# Patient Record
Sex: FEMALE | Race: BLACK OR AFRICAN AMERICAN | URBAN - METROPOLITAN AREA
[De-identification: names, ages, dates, MRNs, and addresses within clinical notes are randomized per-mention and may not be internally consistent; named-entity substitution may affect disease eponyms.]

---

## 2020-06-11 ENCOUNTER — LAB OUTSIDE AN ENCOUNTER (OUTPATIENT)
Dept: URBAN - METROPOLITAN AREA CLINIC 40 | Facility: CLINIC | Age: 45
End: 2020-06-11

## 2020-06-11 ENCOUNTER — OFFICE VISIT (OUTPATIENT)
Dept: URBAN - METROPOLITAN AREA CLINIC 40 | Facility: CLINIC | Age: 45
End: 2020-06-11
Payer: COMMERCIAL

## 2020-06-11 ENCOUNTER — OFFICE VISIT (OUTPATIENT)
Dept: URBAN - METROPOLITAN AREA CLINIC 74 | Facility: CLINIC | Age: 45
End: 2020-06-11

## 2020-06-11 DIAGNOSIS — K64.1 GRADE II HEMORRHOIDS: ICD-10-CM

## 2020-06-11 DIAGNOSIS — K92.1 BLACK STOOL: ICD-10-CM

## 2020-06-11 PROCEDURE — 99215 OFFICE O/P EST HI 40 MIN: CPT | Performed by: INTERNAL MEDICINE

## 2020-06-11 PROCEDURE — G9903 PT SCRN TBCO ID AS NON USER: HCPCS | Performed by: INTERNAL MEDICINE

## 2020-06-11 PROCEDURE — 46221 LIGATION OF HEMORRHOID(S): CPT | Performed by: INTERNAL MEDICINE

## 2020-06-11 PROCEDURE — 1036F TOBACCO NON-USER: CPT | Performed by: INTERNAL MEDICINE

## 2020-06-11 RX ORDER — TAMOXIFEN CITRATE 20 MG/1
TABLET, FILM COATED ORAL
Qty: 0 | Refills: 0 | Status: ACTIVE | COMMUNITY
Start: 1900-01-01

## 2020-06-11 RX ORDER — MELATONIN 5 MG
TABLET ORAL
Qty: 0 | Refills: 0 | Status: ON HOLD | COMMUNITY
Start: 1900-01-01

## 2020-06-11 NOTE — HPI-TODAY'S VISIT:
Patient returns for follow-up for evaluation of recurrent hemorrhoid symptoms.  She had a negative colonoscopy last year and then hemorrhoid banding x3.  She did well for a few months and her bleeding stopped and her discomfort had resolved.  Now over the past few months she is noticed recurrent anal discomfort with bowel movements and also with prolonged sitting.  Urge to defecate.  Sometimes anal spasm symptoms.  Mild fecal soiling.  The main issue today is recurrent discomfort from her hemorrhoids.

## 2020-06-26 ENCOUNTER — OFFICE VISIT (OUTPATIENT)
Dept: URBAN - METROPOLITAN AREA CLINIC 40 | Facility: CLINIC | Age: 45
End: 2020-06-26

## 2020-07-16 ENCOUNTER — OFFICE VISIT (OUTPATIENT)
Dept: URBAN - METROPOLITAN AREA CLINIC 40 | Facility: CLINIC | Age: 45
End: 2020-07-16
Payer: COMMERCIAL

## 2020-07-16 DIAGNOSIS — K64.1 GRADE II HEMORRHOIDS: ICD-10-CM

## 2020-07-16 PROCEDURE — 46221 LIGATION OF HEMORRHOID(S): CPT | Performed by: INTERNAL MEDICINE

## 2020-07-16 RX ORDER — MELATONIN 5 MG
TABLET ORAL
Qty: 0 | Refills: 0 | Status: ON HOLD | COMMUNITY
Start: 1900-01-01

## 2020-07-16 RX ORDER — TAMOXIFEN CITRATE 20 MG/1
TABLET, FILM COATED ORAL
Qty: 0 | Refills: 0 | Status: ACTIVE | COMMUNITY
Start: 1900-01-01

## 2020-10-07 ENCOUNTER — TELEPHONE ENCOUNTER (OUTPATIENT)
Dept: URBAN - METROPOLITAN AREA CLINIC 40 | Facility: CLINIC | Age: 45
End: 2020-10-07

## 2020-10-09 ENCOUNTER — LAB OUTSIDE AN ENCOUNTER (OUTPATIENT)
Dept: URBAN - METROPOLITAN AREA CLINIC 40 | Facility: CLINIC | Age: 45
End: 2020-10-09

## 2020-10-09 ENCOUNTER — OFFICE VISIT (OUTPATIENT)
Dept: URBAN - METROPOLITAN AREA CLINIC 40 | Facility: CLINIC | Age: 45
End: 2020-10-09
Payer: COMMERCIAL

## 2020-10-09 DIAGNOSIS — K64.1 GRADE II HEMORRHOIDS: ICD-10-CM

## 2020-10-09 DIAGNOSIS — R13.10 ESOPHAGEAL DYSPHAGIA: ICD-10-CM

## 2020-10-09 DIAGNOSIS — R12 WATERBRASH SYMPTOM: ICD-10-CM

## 2020-10-09 DIAGNOSIS — K62.5 RECTAL BLEEDING: ICD-10-CM

## 2020-10-09 DIAGNOSIS — K21.9 GERD WITHOUT ESOPHAGITIS: ICD-10-CM

## 2020-10-09 PROCEDURE — 99214 OFFICE O/P EST MOD 30 MIN: CPT | Performed by: INTERNAL MEDICINE

## 2020-10-09 RX ORDER — TAMOXIFEN CITRATE 20 MG/1
TABLET, FILM COATED ORAL
Qty: 0 | Refills: 0 | Status: ACTIVE | COMMUNITY
Start: 1900-01-01

## 2020-10-09 RX ORDER — MELATONIN 5 MG
TABLET ORAL
Qty: 0 | Refills: 0 | Status: ON HOLD | COMMUNITY
Start: 1900-01-01

## 2020-10-09 RX ORDER — PANTOPRAZOLE SODIUM 40 MG/1
1 TABLET TABLET, DELAYED RELEASE ORAL ONCE A DAY
Qty: 90 TABLET | Refills: 3 | OUTPATIENT
Start: 2020-10-09

## 2020-10-09 NOTE — HPI-TODAY'S VISIT:
Patient returns to clinic for follow-up.  Recall she had a normal colonoscopy last year 2019.  She had hemorrhoid banding x3 last year.  I saw her a couple of months ago for recurrent rectal and anal pain with wipe bleeding.  She had re-banding at that time.  I saw her in follow-up and she had 1 additional band placed, right anterior, a total of 5 hemorrhoid bands total now.  She has ongoing discomfort with bowel movements, wipe bleeding and now also blood mixed with stool.  Topical agents such as Preparation H or suppositories or not helping.  No red flag symptoms noted otherwise. She also has new onset epigastric abdominal pain acid reflux-like symptoms chest discomfort and burning.  No specific alleviating or aggravating factors.  She also has vomiting.  Regurgitation is especially bad if she lays down.  She also has water brash at night.  And occasional solid food dysphasia.  She has taken Tums and Pepcid with modest relief of symptoms, still persistent symptoms are noted.

## 2020-10-27 ENCOUNTER — OFFICE VISIT (OUTPATIENT)
Dept: URBAN - METROPOLITAN AREA SURGERY CENTER 30 | Facility: SURGERY CENTER | Age: 45
End: 2020-10-27
Payer: COMMERCIAL

## 2020-10-27 ENCOUNTER — CLAIMS CREATED FROM THE CLAIM WINDOW (OUTPATIENT)
Dept: URBAN - METROPOLITAN AREA CLINIC 4 | Facility: CLINIC | Age: 45
End: 2020-10-27
Payer: COMMERCIAL

## 2020-10-27 DIAGNOSIS — R13.10 ABNORMAL SWALLOWING: ICD-10-CM

## 2020-10-27 DIAGNOSIS — K31.89 OTHER DISEASES OF STOMACH AND DUODENUM: ICD-10-CM

## 2020-10-27 DIAGNOSIS — K21.0 GASTRO-ESOPHAGEAL REFLUX DISEASE WITH ESOPHAGITIS: ICD-10-CM

## 2020-10-27 DIAGNOSIS — K21.9 ACID REFLUX: ICD-10-CM

## 2020-10-27 PROCEDURE — 43450 DILATE ESOPHAGUS 1/MULT PASS: CPT | Performed by: INTERNAL MEDICINE

## 2020-10-27 PROCEDURE — 88305 TISSUE EXAM BY PATHOLOGIST: CPT | Performed by: PATHOLOGY

## 2020-10-27 PROCEDURE — G8907 PT DOC NO EVENTS ON DISCHARG: HCPCS | Performed by: INTERNAL MEDICINE

## 2020-10-27 PROCEDURE — 88312 SPECIAL STAINS GROUP 1: CPT | Performed by: PATHOLOGY

## 2020-10-27 PROCEDURE — 43239 EGD BIOPSY SINGLE/MULTIPLE: CPT | Performed by: INTERNAL MEDICINE

## 2020-11-12 ENCOUNTER — OFFICE VISIT (OUTPATIENT)
Dept: URBAN - METROPOLITAN AREA CLINIC 40 | Facility: CLINIC | Age: 45
End: 2020-11-12

## 2020-11-13 ENCOUNTER — OFFICE VISIT (OUTPATIENT)
Dept: URBAN - METROPOLITAN AREA TELEHEALTH 2 | Facility: TELEHEALTH | Age: 45
End: 2020-11-13
Payer: COMMERCIAL

## 2020-11-13 DIAGNOSIS — K62.5 RECTAL BLEEDING: ICD-10-CM

## 2020-11-13 DIAGNOSIS — R12 WATERBRASH SYMPTOM: ICD-10-CM

## 2020-11-13 DIAGNOSIS — K64.1 GRADE II HEMORRHOIDS: ICD-10-CM

## 2020-11-13 DIAGNOSIS — K21.9 GERD WITHOUT ESOPHAGITIS: ICD-10-CM

## 2020-11-13 DIAGNOSIS — R13.19 OTHER DYSPHAGIA: ICD-10-CM

## 2020-11-13 PROCEDURE — G8417 CALC BMI ABV UP PARAM F/U: HCPCS | Performed by: INTERNAL MEDICINE

## 2020-11-13 PROCEDURE — G8427 DOCREV CUR MEDS BY ELIG CLIN: HCPCS | Performed by: INTERNAL MEDICINE

## 2020-11-13 PROCEDURE — 99213 OFFICE O/P EST LOW 20 MIN: CPT | Performed by: INTERNAL MEDICINE

## 2020-11-13 RX ORDER — PANTOPRAZOLE SODIUM 40 MG/1
1 TABLET TABLET, DELAYED RELEASE ORAL ONCE A DAY
Qty: 90 TABLET | Refills: 3 | Status: ACTIVE | COMMUNITY
Start: 2020-10-09

## 2020-11-13 RX ORDER — TAMOXIFEN CITRATE 20 MG/1
TABLET, FILM COATED ORAL
Qty: 0 | Refills: 0 | Status: ACTIVE | COMMUNITY
Start: 1900-01-01

## 2020-11-13 RX ORDER — PANTOPRAZOLE SODIUM 40 MG/1
1 TABLET TABLET, DELAYED RELEASE ORAL ONCE A DAY
Qty: 90 | Refills: 1 | OUTPATIENT

## 2020-11-13 RX ORDER — MELATONIN 5 MG
TABLET ORAL
Qty: 0 | Refills: 0 | Status: ON HOLD | COMMUNITY
Start: 1900-01-01

## 2020-11-13 NOTE — HPI-TODAY'S VISIT:
Ms. Aly is a 45 year old black female who presents for telehealth visit after recent EGD for evaluation of GERD. History of rectal bleeding.  Recall she had a normal colonoscopy last year 2019.  She had hemorrhoid banding x3 last year.  Has reported recurrent rectal and anal pain with wipe blIeeding.  She had re-banding at that time. Seen in follow-up and she had 1 additional band placed, right anterior, a total of 5 hemorrhoid bands total now.  She has ongoing discomfort with bowel movements, wipe bleeding and now also blood mixed with stool.  Topical agents such as Preparation H or suppositories or not helping.  No red flag symptoms noted otherwise. She also has new onset epigastric abdominal pain acid reflux-like symptoms chest discomfort and burning.  No specific alleviating or aggravating factors.  She also has vomiting.  Regurgitation is especially bad if she lays down.  She also has water brash at night.  And occasional solid food dysphasia.  She has taken Tums and Pepcid with modest relief of symptoms, still persistent symptoms are noted. She did have an EGD with Dr. Bautista on October 27, 2020.  The esophagus, duodenum, and stomach were all normal.  She was empirically dilated to 54 French due to complaint of dysphagia.  Biopsies obtained from the stomach and esophagus with no significant abnormality of the stomach.  Reflux changes from esophageal biopsies, however, no evidence of Lindsay's metaplasia or eosinophilic esophagitis.  No infectious organisms. She reports feeling so much better on pantoprazole 40 mg daily.  Denies any trouble swallowing.  Good appetite and weight stable.  She has modified her diet to avoid caffeine which seems to be a trigger.  Admits that she does like to eat some sweets.  Drinking more water.  No new complaints.  Admits to headache which she originally thought may have been secondary to melatonin.  However, with decreasing melatonin, there was no significant change in her headaches.  She is now taking diclofenac once daily as needed.  Denies any GI bleeding.

## 2020-12-11 ENCOUNTER — OFFICE VISIT (OUTPATIENT)
Dept: URBAN - METROPOLITAN AREA CLINIC 40 | Facility: CLINIC | Age: 45
End: 2020-12-11

## 2021-05-18 ENCOUNTER — APPOINTMENT (RX ONLY)
Dept: URBAN - METROPOLITAN AREA OTHER 10 | Facility: OTHER | Age: 46
Setting detail: DERMATOLOGY
End: 2021-05-18

## 2021-05-18 ENCOUNTER — RX ONLY (OUTPATIENT)
Age: 46
Setting detail: RX ONLY
End: 2021-05-18

## 2021-05-18 DIAGNOSIS — L21.8 OTHER SEBORRHEIC DERMATITIS: ICD-10-CM

## 2021-05-18 DIAGNOSIS — L70.0 ACNE VULGARIS: ICD-10-CM

## 2021-05-18 DIAGNOSIS — L72.8 OTHER FOLLICULAR CYSTS OF THE SKIN AND SUBCUTANEOUS TISSUE: ICD-10-CM

## 2021-05-18 PROBLEM — D23.61 OTHER BENIGN NEOPLASM OF SKIN OF RIGHT UPPER LIMB, INCLUDING SHOULDER: Status: ACTIVE | Noted: 2021-05-18

## 2021-05-18 PROCEDURE — ? OTHER

## 2021-05-18 PROCEDURE — ? COUNSELING

## 2021-05-18 PROCEDURE — ? CONSULTATION EXCISION

## 2021-05-18 PROCEDURE — 99204 OFFICE O/P NEW MOD 45 MIN: CPT

## 2021-05-18 PROCEDURE — ? PRESCRIPTION

## 2021-05-18 RX ORDER — CLOBETASOL PROPIONATE 0.5 MG/ML
SOLUTION TOPICAL QHS
Qty: 50 | Refills: 3 | Status: ERX

## 2021-05-18 RX ORDER — CLOBETASOL PROPIONATE 0.5 MG/ML
SOLUTION TOPICAL QHS
Qty: 50 | Refills: 3 | Status: ERX | COMMUNITY
Start: 2021-05-18

## 2021-05-18 RX ORDER — TRETIONIN 0.5 MG/G
CREAM TOPICAL QHS
Qty: 45 | Refills: 3 | Status: ERX | COMMUNITY
Start: 2021-05-18

## 2021-05-18 RX ADMIN — TRETIONIN: 0.5 CREAM TOPICAL at 00:00

## 2021-05-18 RX ADMIN — CLOBETASOL PROPIONATE: 0.5 SOLUTION TOPICAL at 00:00

## 2021-05-18 ASSESSMENT — LOCATION ZONE DERM
LOCATION ZONE: FACE
LOCATION ZONE: EAR
LOCATION ZONE: SCALP

## 2021-05-18 ASSESSMENT — LOCATION SIMPLE DESCRIPTION DERM
LOCATION SIMPLE: LEFT CHEEK
LOCATION SIMPLE: LEFT EAR
LOCATION SIMPLE: RIGHT CHEEK
LOCATION SIMPLE: POSTERIOR SCALP

## 2021-05-18 ASSESSMENT — LOCATION DETAILED DESCRIPTION DERM
LOCATION DETAILED: RIGHT MID PREAURICULAR CHEEK
LOCATION DETAILED: LEFT INFERIOR CENTRAL MALAR CHEEK
LOCATION DETAILED: RIGHT INFERIOR CENTRAL MALAR CHEEK
LOCATION DETAILED: LEFT POSTERIOR EAR
LOCATION DETAILED: POSTERIOR MID-PARIETAL SCALP

## 2021-05-18 ASSESSMENT — SEVERITY ASSESSMENT: HOW SEVERE IS THIS PATIENT'S CONDITION?: MILD

## 2021-05-18 NOTE — PROCEDURE: OTHER
Note Text (......Xxx Chief Complaint.): This diagnosis correlates with the
Detail Level: Zone
Other (Free Text): Small cyst also posterior left ear
Render Risk Assessment In Note?: no

## 2021-05-18 NOTE — PROCEDURE: COUNSELING
Detail Level: Detailed
Birth Control Pills Counseling: Birth Control Pill Counseling: I discussed with the patient the potential side effects of OCPs including but not limited to increased risk of stroke, heart attack, thrombophlebitis, deep venous thrombosis, hepatic adenomas, breast changes, GI upset, headaches, and depression.  The patient verbalized understanding of the proper use and possible adverse effects of OCPs. All of the patient's questions and concerns were addressed.
Topical Retinoid counseling:  Patient advised to apply a pea-sized amount only at bedtime and wait 30 minutes after washing their face before applying.  If too drying, patient may add a non-comedogenic moisturizer. The patient verbalized understanding of the proper use and possible adverse effects of retinoids.  All of the patient's questions and concerns were addressed.
Spironolactone Pregnancy And Lactation Text: This medication can cause feminization of the male fetus and should be avoided during pregnancy. The active metabolite is also found in breast milk.
Isotretinoin Pregnancy And Lactation Text: This medication is Pregnancy Category X and is considered extremely dangerous during pregnancy. It is unknown if it is excreted in breast milk.
Topical Clindamycin Counseling: Patient counseled that this medication may cause skin irritation or allergic reactions.  In the event of skin irritation, the patient was advised to reduce the amount of the drug applied or use it less frequently.   The patient verbalized understanding of the proper use and possible adverse effects of clindamycin.  All of the patient's questions and concerns were addressed.
Topical Clindamycin Pregnancy And Lactation Text: This medication is Pregnancy Category B and is considered safe during pregnancy. It is unknown if it is excreted in breast milk.
Doxycycline Counseling:  Patient counseled regarding possible photosensitivity and increased risk for sunburn.  Patient instructed to avoid sunlight, if possible.  When exposed to sunlight, patients should wear protective clothing, sunglasses, and sunscreen.  The patient was instructed to call the office immediately if the following severe adverse effects occur:  hearing changes, easy bruising/bleeding, severe headache, or vision changes.  The patient verbalized understanding of the proper use and possible adverse effects of doxycycline.  All of the patient's questions and concerns were addressed.
Minocycline Pregnancy And Lactation Text: This medication is Pregnancy Category D and not consider safe during pregnancy. It is also excreted in breast milk.
High Dose Vitamin A Counseling: Side effects reviewed, pt to contact office should one occur.
Birth Control Pills Pregnancy And Lactation Text: This medication should be avoided if pregnant and for the first 30 days post-partum.
Tetracycline Counseling: Patient counseled regarding possible photosensitivity and increased risk for sunburn.  Patient instructed to avoid sunlight, if possible.  When exposed to sunlight, patients should wear protective clothing, sunglasses, and sunscreen.  The patient was instructed to call the office immediately if the following severe adverse effects occur:  hearing changes, easy bruising/bleeding, severe headache, or vision changes.  The patient verbalized understanding of the proper use and possible adverse effects of tetracycline.  All of the patient's questions and concerns were addressed. Patient understands to avoid pregnancy while on therapy due to potential birth defects.
Use Enhanced Medication Counseling?: No
Doxycycline Pregnancy And Lactation Text: This medication is Pregnancy Category D and not consider safe during pregnancy. It is also excreted in breast milk but is considered safe for shorter treatment courses.
Azithromycin Counseling:  I discussed with the patient the risks of azithromycin including but not limited to GI upset, allergic reaction, drug rash, diarrhea, and yeast infections.
Sarecycline Counseling: Patient advised regarding possible photosensitivity and discoloration of the teeth, skin, lips, tongue and gums.  Patient instructed to avoid sunlight, if possible.  When exposed to sunlight, patients should wear protective clothing, sunglasses, and sunscreen.  The patient was instructed to call the office immediately if the following severe adverse effects occur:  hearing changes, easy bruising/bleeding, severe headache, or vision changes.  The patient verbalized understanding of the proper use and possible adverse effects of sarecycline.  All of the patient's questions and concerns were addressed.
Topical Retinoid Pregnancy And Lactation Text: This medication is Pregnancy Category C. It is unknown if this medication is excreted in breast milk.
Tazorac Counseling:  Patient advised that medication is irritating and drying.  Patient may need to apply sparingly and wash off after an hour before eventually leaving it on overnight.  The patient verbalized understanding of the proper use and possible adverse effects of tazorac.  All of the patient's questions and concerns were addressed.
Dapsone Counseling: I discussed with the patient the risks of dapsone including but not limited to hemolytic anemia, agranulocytosis, rashes, methemoglobinemia, kidney failure, peripheral neuropathy, headaches, GI upset, and liver toxicity.  Patients who start dapsone require monitoring including baseline LFTs and weekly CBCs for the first month, then every month thereafter.  The patient verbalized understanding of the proper use and possible adverse effects of dapsone.  All of the patient's questions and concerns were addressed.
High Dose Vitamin A Pregnancy And Lactation Text: High dose vitamin A therapy is contraindicated during pregnancy and breast feeding.
Topical Sulfur Applications Counseling: Topical Sulfur Counseling: Patient counseled that this medication may cause skin irritation or allergic reactions.  In the event of skin irritation, the patient was advised to reduce the amount of the drug applied or use it less frequently.   The patient verbalized understanding of the proper use and possible adverse effects of topical sulfur application.  All of the patient's questions and concerns were addressed.
Bactrim Counseling:  I discussed with the patient the risks of sulfa antibiotics including but not limited to GI upset, allergic reaction, drug rash, diarrhea, dizziness, photosensitivity, and yeast infections.  Rarely, more serious reactions can occur including but not limited to aplastic anemia, agranulocytosis, methemoglobinemia, blood dyscrasias, liver or kidney failure, lung infiltrates or desquamative/blistering drug rashes.
Azithromycin Pregnancy And Lactation Text: This medication is considered safe during pregnancy and is also secreted in breast milk.
Topical Sulfur Applications Pregnancy And Lactation Text: This medication is Pregnancy Category C and has an unknown safety profile during pregnancy. It is unknown if this topical medication is excreted in breast milk.
Spironolactone Counseling: Patient advised regarding risks of diarrhea, abdominal pain, hyperkalemia, birth defects (for female patients), liver toxicity and renal toxicity. The patient may need blood work to monitor liver and kidney function and potassium levels while on therapy. The patient verbalized understanding of the proper use and possible adverse effects of spironolactone.  All of the patient's questions and concerns were addressed.
Benzoyl Peroxide Counseling: Patient counseled that medicine may cause skin irritation and bleach clothing.  In the event of skin irritation, the patient was advised to reduce the amount of the drug applied or use it less frequently.   The patient verbalized understanding of the proper use and possible adverse effects of benzoyl peroxide.  All of the patient's questions and concerns were addressed.
Erythromycin Counseling:  I discussed with the patient the risks of erythromycin including but not limited to GI upset, allergic reaction, drug rash, diarrhea, increase in liver enzymes, and yeast infections.
Bactrim Pregnancy And Lactation Text: This medication is Pregnancy Category D and is known to cause fetal risk.  It is also excreted in breast milk.
Minocycline Counseling: Patient advised regarding possible photosensitivity and discoloration of the teeth, skin, lips, tongue and gums.  Patient instructed to avoid sunlight, if possible.  When exposed to sunlight, patients should wear protective clothing, sunglasses, and sunscreen.  The patient was instructed to call the office immediately if the following severe adverse effects occur:  hearing changes, easy bruising/bleeding, severe headache, or vision changes.  The patient verbalized understanding of the proper use and possible adverse effects of minocycline.  All of the patient's questions and concerns were addressed.
Tazorac Pregnancy And Lactation Text: This medication is not safe during pregnancy. It is unknown if this medication is excreted in breast milk.
Isotretinoin Counseling: Patient should get monthly blood tests, not donate blood, not drive at night if vision affected, not share medication, and not undergo elective surgery for 6 months after tx completed. Side effects reviewed, pt to contact office should one occur.
Erythromycin Pregnancy And Lactation Text: This medication is Pregnancy Category B and is considered safe during pregnancy. It is also excreted in breast milk.
Benzoyl Peroxide Pregnancy And Lactation Text: This medication is Pregnancy Category C. It is unknown if benzoyl peroxide is excreted in breast milk.
Dapsone Pregnancy And Lactation Text: This medication is Pregnancy Category C and is not considered safe during pregnancy or breast feeding.

## 2021-07-01 ENCOUNTER — TELEPHONE ENCOUNTER (OUTPATIENT)
Dept: URBAN - METROPOLITAN AREA CLINIC 40 | Facility: CLINIC | Age: 46
End: 2021-07-01

## 2021-07-02 ENCOUNTER — OFFICE VISIT (OUTPATIENT)
Dept: URBAN - METROPOLITAN AREA CLINIC 40 | Facility: CLINIC | Age: 46
End: 2021-07-02

## 2021-07-07 ENCOUNTER — OFFICE VISIT (OUTPATIENT)
Dept: URBAN - METROPOLITAN AREA CLINIC 40 | Facility: CLINIC | Age: 46
End: 2021-07-07
Payer: COMMERCIAL

## 2021-07-07 ENCOUNTER — WEB ENCOUNTER (OUTPATIENT)
Dept: URBAN - METROPOLITAN AREA CLINIC 40 | Facility: CLINIC | Age: 46
End: 2021-07-07

## 2021-07-07 VITALS
DIASTOLIC BLOOD PRESSURE: 88 MMHG | BODY MASS INDEX: 34.66 KG/M2 | SYSTOLIC BLOOD PRESSURE: 130 MMHG | WEIGHT: 208 LBS | HEIGHT: 65 IN | TEMPERATURE: 97.7 F | HEART RATE: 90 BPM

## 2021-07-07 DIAGNOSIS — R12 WATERBRASH SYMPTOM: ICD-10-CM

## 2021-07-07 DIAGNOSIS — R13.10 ESOPHAGEAL DYSPHAGIA: ICD-10-CM

## 2021-07-07 DIAGNOSIS — E66.9 OBESITY (BMI 30-39.9): ICD-10-CM

## 2021-07-07 DIAGNOSIS — K64.1 GRADE II HEMORRHOIDS: ICD-10-CM

## 2021-07-07 DIAGNOSIS — R10.9 ABDOMINAL PAIN: ICD-10-CM

## 2021-07-07 DIAGNOSIS — K62.5 RECTAL BLEEDING: ICD-10-CM

## 2021-07-07 DIAGNOSIS — K21.9 GERD WITHOUT ESOPHAGITIS: ICD-10-CM

## 2021-07-07 PROCEDURE — 99213 OFFICE O/P EST LOW 20 MIN: CPT | Performed by: INTERNAL MEDICINE

## 2021-07-07 RX ORDER — PANTOPRAZOLE SODIUM 40 MG/1
1 TABLET TABLET, DELAYED RELEASE ORAL ONCE A DAY
Qty: 90 | Refills: 1 | Status: ACTIVE | COMMUNITY

## 2021-07-07 RX ORDER — MELATONIN 5 MG
TABLET ORAL
Qty: 0 | Refills: 0 | COMMUNITY
Start: 1900-01-01

## 2021-07-07 RX ORDER — TAMOXIFEN CITRATE 20 MG/1
TABLET, FILM COATED ORAL
Qty: 0 | Refills: 0 | Status: ACTIVE | COMMUNITY
Start: 1900-01-01

## 2021-07-07 RX ORDER — PANTOPRAZOLE SODIUM 40 MG/1
1 TABLET TABLET, DELAYED RELEASE ORAL ONCE A DAY
Qty: 90 | Refills: 1

## 2021-07-07 NOTE — PHYSICAL EXAM GASTROINTESTINAL
Abdomen , soft, nontender, nondistended , no guarding or rigidity , no masses palpable , normal bowel sounds , Liver and Spleen , no hepatosplenomegaly , spleen not palpable

## 2021-07-07 NOTE — HPI-TODAY'S VISIT:
Ms. Aly is a 46 year old black female who presents last seen for f/u 11/13/20 following EGD for evaluation of GERD. History of rectal bleeding.  Recall, she had a normal colonoscopy in 2019.  She had hemorrhoid banding x3 in 2019.  Has reported recurrent rectal and anal pain with wipe bleeding.  She had re-banding at that time. Seen in follow-up and she had 1 additional band placed, right anterior, a total of 5 hemorrhoid bands total now.  She has ongoing discomfort with bowel movements, wipe bleeding and now also blood mixed with stool.  Topical agents such as Preparation H or suppositories or not helping.  No red flag symptoms noted otherwise. She also has new onset epigastric abdominal pain acid reflux-like symptoms chest discomfort and burning.  No specific alleviating or aggravating factors.  She also has vomiting.  Regurgitation is especially bad if she lays down.  She also has water brash at night. She did have an EGD with Dr. Bautista on October 27, 2020.  The esophagus, duodenum, and stomach were all normal.  She was empirically dilated to 54 French due to complaint of dysphagia.  Biopsies obtained from the stomach and esophagus with no significant abnormality of the stomach.  Reflux changes from esophageal biopsies, however, no evidence of Lindsay's metaplasia or eosinophilic esophagitis.  No infectious organisms. She reported feeling so much better on pantoprazole 40 mg daily.  Denies any trouble swallowing.  Good appetite and weight stable.  She modified her diet to avoid caffeine which seems to be a trigger.  Admits that she does like to eat some sweets.  Drinking more water. No regular NSAID use.  Denies any GI bleeding. She returns to the office today with complaint of recurrent heartburn reflux now only taking pantoprazole as needed. Admits that she has been drinking more coffee with creamer. Also continues to avoid NSAIDs. States that she cannot state that stress is her trigger for midepigastric abdominal pain. Cough. Denies vomiting or dysphagia.

## 2021-07-07 NOTE — PHYSICAL EXAM HENT:
Head , normocephalic , atraumatic , Face , Face within normal limits , Ears , External ears within normal limits

## 2021-08-05 ENCOUNTER — WEB ENCOUNTER (OUTPATIENT)
Dept: URBAN - METROPOLITAN AREA CLINIC 40 | Facility: CLINIC | Age: 46
End: 2021-08-05

## 2021-08-05 ENCOUNTER — OFFICE VISIT (OUTPATIENT)
Dept: URBAN - METROPOLITAN AREA CLINIC 40 | Facility: CLINIC | Age: 46
End: 2021-08-05
Payer: COMMERCIAL

## 2021-08-05 ENCOUNTER — OFFICE VISIT (OUTPATIENT)
Dept: URBAN - METROPOLITAN AREA CLINIC 40 | Facility: CLINIC | Age: 46
End: 2021-08-05

## 2021-08-05 VITALS
WEIGHT: 211 LBS | TEMPERATURE: 97.5 F | HEART RATE: 80 BPM | HEIGHT: 65 IN | SYSTOLIC BLOOD PRESSURE: 128 MMHG | DIASTOLIC BLOOD PRESSURE: 90 MMHG | BODY MASS INDEX: 35.16 KG/M2

## 2021-08-05 DIAGNOSIS — R13.10 ESOPHAGEAL DYSPHAGIA: ICD-10-CM

## 2021-08-05 DIAGNOSIS — R12 WATERBRASH SYMPTOM: ICD-10-CM

## 2021-08-05 DIAGNOSIS — K62.5 RECTAL BLEEDING: ICD-10-CM

## 2021-08-05 DIAGNOSIS — K21.9 GERD WITHOUT ESOPHAGITIS: ICD-10-CM

## 2021-08-05 DIAGNOSIS — K64.1 GRADE II HEMORRHOIDS: ICD-10-CM

## 2021-08-05 DIAGNOSIS — R10.9 ABDOMINAL PAIN: ICD-10-CM

## 2021-08-05 DIAGNOSIS — E66.9 OBESITY (BMI 30-39.9): ICD-10-CM

## 2021-08-05 PROCEDURE — 99213 OFFICE O/P EST LOW 20 MIN: CPT | Performed by: INTERNAL MEDICINE

## 2021-08-05 RX ORDER — PANTOPRAZOLE SODIUM 40 MG/1
1 TABLET TABLET, DELAYED RELEASE ORAL ONCE A DAY
Qty: 90 | Refills: 1 | Status: ACTIVE | COMMUNITY

## 2021-08-05 RX ORDER — MELATONIN 5 MG
TABLET ORAL
Qty: 0 | Refills: 0 | Status: DISCONTINUED | COMMUNITY
Start: 1900-01-01

## 2021-08-05 RX ORDER — TAMOXIFEN CITRATE 20 MG/1
TABLET, FILM COATED ORAL
Qty: 0 | Refills: 0 | Status: ON HOLD | COMMUNITY
Start: 1900-01-01

## 2021-08-05 NOTE — HPI-TODAY'S VISIT:
Ms. Aly is a 46 year old black female who presents last seen for f/u 7/7/21 for evaluation of GERD. History of rectal bleeding.  Recall, she had a normal colonoscopy in 2019.  She had hemorrhoid banding x3 in 2019.  Has reported recurrent rectal and anal pain with wipe bleeding.  She had re-banding at that time. Seen in follow-up and she had 1 additional band placed, right anterior, a total of 5 hemorrhoid bands total now.  Recent onset of epigastric abdominal pain acid reflux-like symptoms chest discomfort and burning.  No specific alleviating or aggravating factors.  She also has vomiting.  Regurgitation is especially bad if she lays down.  She also has water brash at night. She did have an EGD with Dr. Bautista on October 27, 2020.  The esophagus, duodenum, and stomach were all normal.  She was empirically dilated to 54 French due to complaint of dysphagia.  Biopsies obtained from the stomach and esophagus with no significant abnormality of the stomach.  Reflux changes from esophageal biopsies, however, no evidence of Lindsay's metaplasia or eosinophilic esophagitis.  No infectious organisms. She reported feeling so much better on pantoprazole 40 mg daily.  Denies any trouble swallowing.  Good appetite and weight stable.  She modified her diet to avoid caffeine which seems to be a trigger.  Admits that she does like to eat some sweets.  Drinking more water. No regular NSAID use.  Denies any GI bleeding. She returns to the office today with complaint of recurrent heartburn reflux now only taking pantoprazole as needed. She continues to avoid NSAIDs. States that she cannot state that stress is her trigger for midepigastric abdominal pain. Cough. Denies vomiting or dysphagia. Essentially normal RUQ US 7/15/21. Distended gallbladder without stones or cholecystitis. CBD normal. Liver 16mm , otherwise normal. Weight increased since last visit. She is planning to once again improve diet at home.

## 2021-08-25 ENCOUNTER — OFFICE VISIT (OUTPATIENT)
Dept: URBAN - METROPOLITAN AREA CLINIC 40 | Facility: CLINIC | Age: 46
End: 2021-08-25

## 2021-09-09 ENCOUNTER — OFFICE VISIT (OUTPATIENT)
Dept: URBAN - METROPOLITAN AREA CLINIC 40 | Facility: CLINIC | Age: 46
End: 2021-09-09
Payer: COMMERCIAL

## 2021-09-09 VITALS
HEIGHT: 65 IN | DIASTOLIC BLOOD PRESSURE: 90 MMHG | SYSTOLIC BLOOD PRESSURE: 130 MMHG | TEMPERATURE: 97.9 F | WEIGHT: 210 LBS | BODY MASS INDEX: 34.99 KG/M2

## 2021-09-09 DIAGNOSIS — E66.9 OBESITY (BMI 30-39.9): ICD-10-CM

## 2021-09-09 DIAGNOSIS — R10.9 ABDOMINAL PAIN: ICD-10-CM

## 2021-09-09 DIAGNOSIS — K21.9 GERD WITHOUT ESOPHAGITIS: ICD-10-CM

## 2021-09-09 DIAGNOSIS — R13.10 ESOPHAGEAL DYSPHAGIA: ICD-10-CM

## 2021-09-09 DIAGNOSIS — K64.1 GRADE II HEMORRHOIDS: ICD-10-CM

## 2021-09-09 DIAGNOSIS — R12 WATERBRASH SYMPTOM: ICD-10-CM

## 2021-09-09 DIAGNOSIS — K62.5 RECTAL BLEEDING: ICD-10-CM

## 2021-09-09 PROCEDURE — 99213 OFFICE O/P EST LOW 20 MIN: CPT | Performed by: INTERNAL MEDICINE

## 2021-09-09 RX ORDER — PANTOPRAZOLE SODIUM 40 MG/1
1 TABLET TABLET, DELAYED RELEASE ORAL ONCE A DAY
Qty: 30 | Refills: 2

## 2021-09-09 RX ORDER — PANTOPRAZOLE SODIUM 40 MG/1
1 TABLET TABLET, DELAYED RELEASE ORAL ONCE A DAY
Qty: 90 | Refills: 1 | Status: ACTIVE | COMMUNITY

## 2021-09-09 RX ORDER — DICYCLOMINE HYDROCHLORIDE 20 MG/1
1 TABLET TABLET ORAL BID PRN
Qty: 60 | Refills: 2 | OUTPATIENT
Start: 2021-09-09 | End: 2021-12-07

## 2021-09-09 RX ORDER — TAMOXIFEN CITRATE 20 MG/1
TABLET, FILM COATED ORAL
Qty: 0 | Refills: 0 | COMMUNITY
Start: 1900-01-01

## 2021-09-09 NOTE — HPI-TODAY'S VISIT:
Ms. Aly is a 46 year old black female who presents last seen for f/u 8/5/21 for evaluation of GERD. History of rectal bleeding.  Recall, she had a normal colonoscopy in 2019.  She had hemorrhoid banding x3 in 2019.  Has reported recurrent rectal and anal pain with wipe bleeding.  She had re-banding at that time. Seen in follow-up and she had 1 additional band placed, right anterior, a total of 5 hemorrhoid bands total now.  Recent onset of epigastric abdominal pain acid reflux-like symptoms chest discomfort and burning.  No specific alleviating or aggravating factors.  She also has vomiting.  Regurgitation is especially bad if she lays down.   She did have an EGD with Dr. Bautista on October 27, 2020.  The esophagus, duodenum, and stomach were all normal.  She was empirically dilated to 54 French due to complaint of dysphagia.  Biopsies obtained from the stomach and esophagus with no significant abnormality of the stomach.  Reflux changes from esophageal biopsies, however, no evidence of Lindsay's metaplasia or eosinophilic esophagitis.  No infectious organisms. She reported feeling so much better on pantoprazole 40 mg daily.  Denies any trouble swallowing.  Good appetite and weight stable.  She modified her diet to avoid caffeine which seems to be a trigger.  Admits that she does like to eat some sweets.  Drinking more water. No regular NSAID use.  Denies any GI bleeding. She returns to the office today with complaint of recurrent heartburn reflux now only taking pantoprazole as needed. She continues to avoid NSAIDs. States that she cannot state that stress is her trigger for midepigastric abdominal pain. Cough. Denies vomiting or dysphagia. Essentially normal RUQ US 7/15/21. Distended gallbladder without stones or cholecystitis. CBD normal. Liver 16mm , otherwise normal. Weight increased since last visit. She is planning to once again improve diet at home.

## 2021-09-10 ENCOUNTER — TELEPHONE ENCOUNTER (OUTPATIENT)
Dept: URBAN - METROPOLITAN AREA CLINIC 40 | Facility: CLINIC | Age: 46
End: 2021-09-10

## 2021-10-20 ENCOUNTER — OFFICE VISIT (OUTPATIENT)
Dept: URBAN - METROPOLITAN AREA CLINIC 40 | Facility: CLINIC | Age: 46
End: 2021-10-20

## 2021-10-20 RX ORDER — TAMOXIFEN CITRATE 20 MG/1
TABLET, FILM COATED ORAL
Qty: 0 | Refills: 0 | COMMUNITY
Start: 1900-01-01

## 2021-10-20 RX ORDER — DICYCLOMINE HYDROCHLORIDE 20 MG/1
1 TABLET TABLET ORAL BID PRN
Qty: 60 | Refills: 2 | Status: ACTIVE | COMMUNITY
Start: 2021-09-09 | End: 2021-12-07

## 2021-10-20 RX ORDER — PANTOPRAZOLE SODIUM 40 MG/1
1 TABLET TABLET, DELAYED RELEASE ORAL ONCE A DAY
Qty: 30 | Refills: 2 | Status: ACTIVE | COMMUNITY

## 2021-11-19 ENCOUNTER — OFFICE VISIT (OUTPATIENT)
Dept: URBAN - METROPOLITAN AREA CLINIC 40 | Facility: CLINIC | Age: 46
End: 2021-11-19

## 2021-11-19 RX ORDER — PANTOPRAZOLE SODIUM 40 MG/1
1 TABLET TABLET, DELAYED RELEASE ORAL ONCE A DAY
Qty: 30 | Refills: 2 | Status: ACTIVE | COMMUNITY

## 2021-11-19 RX ORDER — TAMOXIFEN CITRATE 20 MG/1
TABLET, FILM COATED ORAL
Qty: 0 | Refills: 0 | Status: ACTIVE | COMMUNITY
Start: 1900-01-01

## 2021-11-19 RX ORDER — DICYCLOMINE HYDROCHLORIDE 20 MG/1
1 TABLET TABLET ORAL BID PRN
Qty: 60 | Refills: 2 | Status: ACTIVE | COMMUNITY
Start: 2021-09-09 | End: 2021-12-07

## 2022-12-07 ENCOUNTER — TELEPHONE ENCOUNTER (OUTPATIENT)
Dept: URBAN - METROPOLITAN AREA CLINIC 40 | Facility: CLINIC | Age: 47
End: 2022-12-07

## 2023-02-01 ENCOUNTER — OFFICE VISIT (OUTPATIENT)
Dept: URBAN - METROPOLITAN AREA CLINIC 74 | Facility: CLINIC | Age: 48
End: 2023-02-01
Payer: COMMERCIAL

## 2023-02-01 ENCOUNTER — LAB OUTSIDE AN ENCOUNTER (OUTPATIENT)
Dept: URBAN - METROPOLITAN AREA CLINIC 74 | Facility: CLINIC | Age: 48
End: 2023-02-01

## 2023-02-01 VITALS
DIASTOLIC BLOOD PRESSURE: 78 MMHG | SYSTOLIC BLOOD PRESSURE: 132 MMHG | TEMPERATURE: 98.1 F | HEART RATE: 96 BPM | WEIGHT: 202 LBS | HEIGHT: 65 IN | OXYGEN SATURATION: 97 % | BODY MASS INDEX: 33.66 KG/M2

## 2023-02-01 DIAGNOSIS — K21.9 GERD WITHOUT ESOPHAGITIS: ICD-10-CM

## 2023-02-01 DIAGNOSIS — K62.5 RECTAL BLEEDING: ICD-10-CM

## 2023-02-01 DIAGNOSIS — R10.9 ABDOMINAL PAIN: ICD-10-CM

## 2023-02-01 DIAGNOSIS — E66.9 OBESITY (BMI 30-39.9): ICD-10-CM

## 2023-02-01 PROBLEM — 414916001 OBESITY: Status: ACTIVE | Noted: 2021-07-07

## 2023-02-01 PROBLEM — 266435005: Status: ACTIVE | Noted: 2020-10-09

## 2023-02-01 PROBLEM — 40890009: Status: ACTIVE | Noted: 2020-10-09

## 2023-02-01 PROCEDURE — 99214 OFFICE O/P EST MOD 30 MIN: CPT | Performed by: INTERNAL MEDICINE

## 2023-02-01 RX ORDER — PANTOPRAZOLE SODIUM 40 MG/1
1 TABLET TABLET, DELAYED RELEASE ORAL ONCE A DAY
Qty: 30 | Refills: 2 | Status: ON HOLD | COMMUNITY

## 2023-02-01 RX ORDER — DICYCLOMINE HYDROCHLORIDE 20 MG/1
1 TABLET TABLET ORAL THREE TIMES A DAY
Qty: 270 TABLET | Refills: 3 | OUTPATIENT
Start: 2023-02-01 | End: 2024-01-27

## 2023-02-01 RX ORDER — AMITRIPTYLINE HYDROCHLORIDE 10 MG/1
1 TABLET AT BEDTIME TABLET, FILM COATED ORAL ONCE A DAY
Qty: 90 TABLET | Refills: 3 | OUTPATIENT
Start: 2023-02-01

## 2023-02-01 RX ORDER — TAMOXIFEN CITRATE 20 MG/1
TABLET, FILM COATED ORAL
Qty: 0 | Refills: 0 | Status: ON HOLD | COMMUNITY
Start: 1900-01-01

## 2023-02-01 NOTE — HPI-TODAY'S VISIT:
Ms. Aly is a 48 year old black female who presents last seen for f/u 8/5/21 for evaluation of left flank pain and GERD.  She wishes to schedule colonoscopy at this time. abdominal pain, diffuse, no a/a factors, dull and throbbing, non radiating. CT 1/20/23: Negative for acute changes (AHI), reviewed copy today. History of rectal bleeding.   Recall, she had a normal colonoscopy in 2019.   She had hemorrhoid banding x3 in 2019.   Has reported recurrent rectal and anal pain with wipe bleeding.   She had re-banding at last visit. Seen in follow-up and she had 1 additional band placed, right anterior, a total of 5 hemorrhoid bands total now.    She also has hx GERD and vomiting.  Regurgitation is especially bad if she lays down.    She did have an EGD with Dr. Bautista on October 27, 2020.  The esophagus, duodenum, and stomach were all normal.  She was empirically dilated to 54 Nep[place] due to complaint of dysphagia.  Biopsies obtained from the stomach and esophagus with no significant abnormality of the stomach.  Reflux changes from esophageal biopsies, however, no evidence of Lindsay's metaplasia or eosinophilic esophagitis.    She modified her diet to avoid caffeine which seems to be a trigger.  Admits that she does like to eat some sweets.  Drinking more water. No regular NSAID use.   Essentially normal RUQ US 7/15/21. Distended gallbladder without stones or cholecystitis. CBD normal. Liver 16mm , otherwise normal. Weight increased since last visit. She is planning to once again improve diet at home.

## 2023-03-14 ENCOUNTER — OFFICE VISIT (OUTPATIENT)
Dept: URBAN - METROPOLITAN AREA SURGERY CENTER 30 | Facility: SURGERY CENTER | Age: 48
End: 2023-03-14
Payer: COMMERCIAL

## 2023-03-14 DIAGNOSIS — K62.5 ANAL BLEEDING: ICD-10-CM

## 2023-03-14 DIAGNOSIS — R10.32 ABDOMINAL CRAMPING IN LEFT LOWER QUADRANT: ICD-10-CM

## 2023-03-14 PROCEDURE — 45378 DIAGNOSTIC COLONOSCOPY: CPT | Performed by: INTERNAL MEDICINE

## 2023-03-14 PROCEDURE — G8907 PT DOC NO EVENTS ON DISCHARG: HCPCS | Performed by: INTERNAL MEDICINE

## 2023-04-12 ENCOUNTER — OFFICE VISIT (OUTPATIENT)
Dept: URBAN - METROPOLITAN AREA CLINIC 74 | Facility: CLINIC | Age: 48
End: 2023-04-12

## 2023-04-12 ENCOUNTER — WEB ENCOUNTER (OUTPATIENT)
Dept: URBAN - METROPOLITAN AREA CLINIC 74 | Facility: CLINIC | Age: 48
End: 2023-04-12

## 2023-04-12 RX ORDER — DICYCLOMINE HYDROCHLORIDE 20 MG/1
1 TABLET TABLET ORAL THREE TIMES A DAY
Qty: 270 TABLET | Refills: 3 | Status: ACTIVE | COMMUNITY
Start: 2023-02-01 | End: 2024-01-27

## 2023-04-12 RX ORDER — TAMOXIFEN CITRATE 20 MG/1
TABLET, FILM COATED ORAL
Qty: 0 | Refills: 0 | Status: ON HOLD | COMMUNITY
Start: 1900-01-01

## 2023-04-12 RX ORDER — AMITRIPTYLINE HYDROCHLORIDE 10 MG/1
1 TABLET AT BEDTIME TABLET, FILM COATED ORAL ONCE A DAY
Qty: 90 TABLET | Refills: 3 | Status: ACTIVE | COMMUNITY
Start: 2023-02-01

## 2023-04-12 RX ORDER — PANTOPRAZOLE SODIUM 40 MG/1
1 TABLET TABLET, DELAYED RELEASE ORAL ONCE A DAY
Qty: 30 | Refills: 2 | Status: ON HOLD | COMMUNITY

## 2023-04-12 NOTE — HPI-TODAY'S VISIT:
Ms. Aly is a 48 year old black female who presents for follow up for abd pain and rectal bleeding.  Recent colonoscopy 2/2023 negative (small hemorrhoids only). Recent HIDA normal, see below. Last visit 1/23: Rx Dicyclomine and Amitriptyline 10mg started for likely IBS. abdominal pain, diffuse, no a/a factors, dull and throbbing, non radiating. CT 1/20/23: Negative for acute changes (AHI), reviewed copy today.  History of rectal bleeding.   Recall, she had a normal colonoscopy in 2019.   She had hemorrhoid banding x3 in 2019.   Has reported recurrent rectal and anal pain with wipe bleeding.   She had re-banding at last visit. Seen in follow-up and she had 1 additional band placed, right anterior, a total of 5 hemorrhoid bands total now.    She also has hx GERD and vomiting.  Regurgitation is especially bad if she lays down.    She did have an EGD with Dr. Bautista on October 27, 2020.  The esophagus, duodenum, and stomach were all normal.  She was empirically dilated to 54 [place] due to complaint of dysphagia.  Biopsies obtained from the stomach and esophagus with no significant abnormality of the stomach.  Reflux changes from esophageal biopsies, however, no evidence of Lindsay's metaplasia or eosinophilic esophagitis.   ===== EXAM: Nuclear medicine gallbladder emptying study with Drug    CLINICAL INDICATION:  K21.9 (Gastro-esophageal reflux disease without esophagitis)  R10.9 (Unspecified abdominal pain)  R10.0 (Acute abdomen)    TECHNIQUE:  Multiple images of the right upper quadrant were obtained following the administration of Choletec 6.1 mCi and cholecystokinin 1.9 micrograms intravenously.   COMPARISON:  Ultrasound of the right upper quadrant and 6/15/2021   FINDINGS:  The gallbladder ejection fraction is calculated as 44.9 %. Normal value is greater than 35%. There is normal uptake of radiotracer by the liver with demonstration of gallbladder as well as common bile duct and small bowel activity.   IMPRESSION: .         .    No scintigraphic evidence of delayed gallbladder emptying.    Released By: MITZY BEATTY MD 2/14/2023 8:55 AM

## 2023-05-12 ENCOUNTER — DASHBOARD ENCOUNTERS (OUTPATIENT)
Age: 48
End: 2023-05-12

## 2023-05-12 ENCOUNTER — OFFICE VISIT (OUTPATIENT)
Dept: URBAN - METROPOLITAN AREA CLINIC 40 | Facility: CLINIC | Age: 48
End: 2023-05-12
Payer: COMMERCIAL

## 2023-05-12 VITALS
SYSTOLIC BLOOD PRESSURE: 130 MMHG | WEIGHT: 213.8 LBS | DIASTOLIC BLOOD PRESSURE: 82 MMHG | TEMPERATURE: 96.8 F | BODY MASS INDEX: 35.62 KG/M2 | HEIGHT: 65 IN | HEART RATE: 89 BPM

## 2023-05-12 DIAGNOSIS — K21.9 GERD WITHOUT ESOPHAGITIS: ICD-10-CM

## 2023-05-12 DIAGNOSIS — K62.5 RECTAL BLEEDING: ICD-10-CM

## 2023-05-12 DIAGNOSIS — R10.9 ACUTE ABDOMINAL PAIN IN LEFT FLANK: ICD-10-CM

## 2023-05-12 DIAGNOSIS — E66.9 OBESITY (BMI 30-39.9): ICD-10-CM

## 2023-05-12 PROCEDURE — 99214 OFFICE O/P EST MOD 30 MIN: CPT | Performed by: INTERNAL MEDICINE

## 2023-05-12 RX ORDER — TAMOXIFEN CITRATE 20 MG/1
TABLET, FILM COATED ORAL
Qty: 0 | Refills: 0 | Status: ON HOLD | COMMUNITY
Start: 1900-01-01

## 2023-05-12 RX ORDER — PANTOPRAZOLE SODIUM 40 MG/1
1 TABLET TABLET, DELAYED RELEASE ORAL ONCE A DAY
Qty: 30 | Refills: 2 | Status: ON HOLD | COMMUNITY

## 2023-05-12 RX ORDER — AMITRIPTYLINE HYDROCHLORIDE 10 MG/1
1 TABLET AT BEDTIME TABLET, FILM COATED ORAL ONCE A DAY
Qty: 90 TABLET | Refills: 3 | Status: ON HOLD | COMMUNITY
Start: 2023-02-01

## 2023-05-12 RX ORDER — GABAPENTIN 300 MG/1
1 CAPSULE CAPSULE ORAL ONCE A DAY
Qty: 30 | Refills: 5 | OUTPATIENT
Start: 2023-05-12

## 2023-05-12 RX ORDER — DICYCLOMINE HYDROCHLORIDE 20 MG/1
1 TABLET TABLET ORAL THREE TIMES A DAY
Qty: 270 TABLET | Refills: 3 | Status: ACTIVE | COMMUNITY
Start: 2023-02-01 | End: 2024-01-27

## 2023-05-12 NOTE — HPI-TODAY'S VISIT:
Pt f/u visit. Seen 2/23 for abd pain. Colonoscopy here 3/23 negative. HIDA 4/223 negative. Also GERD and n/v. ===== Prior note: Ms. Aly is a 48 year old black female who presents for follow up for abd pain and rectal bleeding.  Recent colonoscopy 2/2023 negative (small hemorrhoids only). Recent HIDA normal, see below. Last visit 1/23: Rx Dicyclomine and Amitriptyline 10mg started for likely IBS. abdominal pain, diffuse, no a/a factors, dull and throbbing, non radiating. CT 1/20/23: Negative for acute changes (AHI), reviewed copy today.  History of rectal bleeding.   Recall, she had a normal colonoscopy in 2019.   She had hemorrhoid banding x3 in 2019.   Has reported recurrent rectal and anal pain with wipe bleeding.   She had re-banding at last visit. Seen in follow-up and she had 1 additional band placed, right anterior, a total of 5 hemorrhoid bands total now.    She also has hx GERD and vomiting.  Regurgitation is especially bad if she lays down.    She did have an EGD with Dr. Bautista on October 27, 2020.  The esophagus, duodenum, and stomach were all normal.  She was empirically dilated to 54 [place] due to complaint of dysphagia.  Biopsies obtained from the stomach and esophagus with no significant abnormality of the stomach.  Reflux changes from esophageal biopsies, however, no evidence of Lindsay's metaplasia or eosinophilic esophagitis.   ===== EXAM: Nuclear medicine gallbladder emptying study with Drug    CLINICAL INDICATION:  K21.9 (Gastro-esophageal reflux disease without esophagitis)  R10.9 (Unspecified abdominal pain)  R10.0 (Acute abdomen)    TECHNIQUE:  Multiple images of the right upper quadrant were obtained following the administration of Choletec 6.1 mCi and cholecystokinin 1.9 micrograms intravenously.   COMPARISON:  Ultrasound of the right upper quadrant and 6/15/2021   FINDINGS:  The gallbladder ejection fraction is calculated as 44.9 %. Normal value is greater than 35%. There is normal uptake of radiotracer by the liver with demonstration of gallbladder as well as common bile duct and small bowel activity.   IMPRESSION: .         .    No scintigraphic evidence of delayed gallbladder emptying.     Released By: MITZY BEATTY MD 2/14/2023 8:55 AM